# Patient Record
Sex: FEMALE | Race: WHITE | NOT HISPANIC OR LATINO | URBAN - METROPOLITAN AREA
[De-identification: names, ages, dates, MRNs, and addresses within clinical notes are randomized per-mention and may not be internally consistent; named-entity substitution may affect disease eponyms.]

---
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
## 2021-02-24 ENCOUNTER — COMMUNICATION - HEALTHEAST (OUTPATIENT)
Dept: PEDIATRICS | Facility: CLINIC | Age: 21
End: 2021-02-24

## 2021-02-24 ENCOUNTER — OFFICE VISIT - HEALTHEAST (OUTPATIENT)
Dept: PEDIATRICS | Facility: CLINIC | Age: 21
End: 2021-02-24

## 2021-02-24 DIAGNOSIS — N92.6 IRREGULAR MENSES: ICD-10-CM

## 2021-02-24 RX ORDER — NORGESTIMATE AND ETHINYL ESTRADIOL 7DAYSX3 LO
1 KIT ORAL DAILY
Qty: 1 PACKAGE | Refills: 0 | Status: SHIPPED | OUTPATIENT
Start: 2021-02-24

## 2021-02-24 ASSESSMENT — PATIENT HEALTH QUESTIONNAIRE - PHQ9: SUM OF ALL RESPONSES TO PHQ QUESTIONS 1-9: 4

## 2021-05-27 ASSESSMENT — PATIENT HEALTH QUESTIONNAIRE - PHQ9: SUM OF ALL RESPONSES TO PHQ QUESTIONS 1-9: 4

## 2021-05-29 NOTE — TELEPHONE ENCOUNTER
Patient is on the CSS schedule for Meningococcal B shot- 1st dose- per triage.    Please place orders.     Hoda Prieto CMA  12:44 PM  6/4/2019

## 2021-05-30 NOTE — TELEPHONE ENCOUNTER
Patient is here now for Men B injection, will pend order last dose.     Meningococcal B (PF) 6/11/2019

## 2021-05-31 VITALS — HEIGHT: 66 IN | WEIGHT: 128.7 LBS | BODY MASS INDEX: 20.68 KG/M2

## 2021-05-31 NOTE — PROGRESS NOTES
FOOT AND ANKLE SURGERY/PODIATRY CONSULT NOTE         ASSESSMENT:   Ingrown toenail right great toe      TREATMENT:  Performed partial nail excision and matrixectomy of the lateral border of the right great toenail today.        HPI: Mitsy Mcfarland presented to the clinic today complaining of a painful ingrown toenail involving her right great toe.  The patient stated that she has had a history of painful ingrown toenails involving this toe.  She stated that recently she had severe pain, redness and swelling.  She is currently taking the Keflex.  She stated that the redness and swelling have improved but her pain persist.  The pain is aggravated by her shoe gear.  It is a sharp pain.  There are no factors which relieve her pain.  She stated that she slightly injured her toe recently.      Past Medical History:   Diagnosis Date     VUR (vesicoureteric reflux)        History reviewed. No pertinent surgical history.    No Known Allergies      Current Outpatient Medications:      cephalexin (KEFLEX) 500 MG capsule, Take 500 mg by mouth 2 (two) times a day., Disp: , Rfl:      TRI-SPRINTEC, 28, 0.18/0.215/0.25 mg-35 mcg (28) Tab tablet, TAKE 1 TABLET DAILY, Disp: 84 tablet, Rfl: 0    Family History   Problem Relation Age of Onset     Diabetes type I Brother      Hypertension Mother      Breast cancer Paternal Aunt      Diabetes Maternal Grandmother      Lung cancer Maternal Grandfather      Heart attack Paternal Grandfather        Social History     Socioeconomic History     Marital status: Single     Spouse name: Not on file     Number of children: Not on file     Years of education: Not on file     Highest education level: Not on file   Occupational History     Not on file   Social Needs     Financial resource strain: Not on file     Food insecurity:     Worry: Not on file     Inability: Not on file     Transportation needs:     Medical: Not on file     Non-medical: Not on file   Tobacco Use     Smoking status: Never  Smoker     Smokeless tobacco: Never Used   Substance and Sexual Activity     Alcohol use: Not on file     Drug use: Not on file     Sexual activity: Not on file   Lifestyle     Physical activity:     Days per week: Not on file     Minutes per session: Not on file     Stress: Not on file   Relationships     Social connections:     Talks on phone: Not on file     Gets together: Not on file     Attends Anabaptism service: Not on file     Active member of club or organization: Not on file     Attends meetings of clubs or organizations: Not on file     Relationship status: Not on file     Intimate partner violence:     Fear of current or ex partner: Not on file     Emotionally abused: Not on file     Physically abused: Not on file     Forced sexual activity: Not on file   Other Topics Concern     Not on file   Social History Narrative    Lives with mother, father and brother Ellis       Review of Systems - Patient denies fever, chills, rash, wound, stiffness, limping, numbness, weakness, heart burn, blood in stool, chest pain with activity, calf pain when walking, shortness of breath with activity, chronic cough, easy bleeding/bruising, swelling of ankles, excessive thirst, fatigue, depression, anxiety.  Patient admits to painful ingrown toenail right great toe.      OBJECTIVE:  Appearance: alert, well appearing, and in no distress.    Vitals:    08/27/19 0957   BP: 100/70   Pulse: 101   SpO2: 98%       BMI= Body mass index is 20.81 kg/m .    General appearance: Patient is alert and fully cooperative with history & exam.  No sign of distress is noted during the visit.  Psychiatric: Affect is pleasant & appropriate.  Patient appears motivated to improve health.  Respiratory: Breathing is regular & unlabored while sitting.  HEENT: Hearing is intact to spoken word.  Speech is clear.  No gross evidence of visual impairment that would impact ambulation.    Vascular: Dorsalis pedis and posterior tibial pulses are palpable. There  is pedal hair growth bilaterally.  CFT < 3 sec from anterior tibial surface to distal digits bilaterally. There is no appreciable edema noted.  Dermatologic: All nails are normal length and color.  The lateral border of the right great toenail is severely incurvated.  There is pain on palpation along the lateral margin of the right great toenail.  Turgor and texture are within normal limits. No coloration or temperature changes. No primary or secondary lesions noted.  Neurologic: All epicritic and proprioceptive sensations are grossly intact bilaterally.  Musculoskeletal: All active and passive ankle, subtalar, midtarsal, and 1st MPJ range of motion are grossly intact without pain or crepitus, with the exception of none. Manual muscle strength is within normal limits bilaterally. All dorsiflexors, plantarflexors, invertors, evertors are intact bilaterally. Tenderness present to lateral margin of the right great toenail on palpation.  No tenderness to right great toe with range of motion. Calf is soft/non-tender without warmth/induration    Imaging:     No results found.       TREATMENT:  Performed partial nail excision and matrixectomy of the lateral border of the right great toenail today under local anesthesia of 2% lidocaine plain via the phenol and alcohol technique.  The patient tolerated the procedure and anesthesia well and was discharged in good condition.  She was given both written and verbal postoperative instructions.  She is to return to the clinic as needed.    Fantasma Jimenez; LUPE  [facility] Foot & Ankle Surgery/Podiatry

## 2021-05-31 NOTE — PATIENT INSTRUCTIONS - HE
POSTOPERATIVE INSTRUCTIONS AFTER CHEMICAL NAIL REMOVAL SURGERY    What to expect after surgery:  Your toe will be numb for around 2-8 hours after your nail procedure due to the shots that were given.  Expect some degree of soreness in your toe later today when the numbness wears off.  Rest, elevation and ice applied at the ankle will help ease the pain. Your bandage was wrapped snug to cut down on bleeding.    This may feel tight when the numbness wears off.  Please remove the bandage tomorrow morning and begin the foot soaks described below.  Warm water will feel good and helps to ease the pain  How to Care for Your Toe After Surgery  One daily foot soak will be necessary to heal properly.  Chemicals were used to kill the root of the nail.  Expect local redness, drainage and tenderness , this will last for 6-8 weeks.  Soaking helps loosen the scab and dried drainage.  Failure to soak leads to a hard scab that blocks drainage.  Back up drainage increases the pain and the scab may need to be removed with another office procedure.  You will heal much quicker and be more comfortable if you are consistent with local wound care and foot soaks.  Soak one time every day for 2 weeks.  Soaks should last 10 minutes.  Soak after taking a shower to get the germs out.  Soak in warm water with hydrogen peroxide 5 parts water to 1 part hydrogen peroxide.  A small amount of bleeding may be noted which is normal.   Clean the surgical site with a Q-tip during each soak.  Dip the Q-tip in the water and gently clean away any crusted drainage.  The area may be tender but you will not harm anything with the Q-tip.  Pat the area dry and allow a few minutes to air dry before applying any bandages.  Flexible fabric style band aids work best.  In general, the area will be wet from drainage.  A small dab of antibiotic ointment is okay but watch out for excessive moisture.  White and wrinkled skin is a sign of too much moisture and that the  skin needs to be dried out. It is ok to allow the toe some air by removing the band aid as needed.  Activity  Feel free to do normal activities as tolerated on the following day.  Wear open toe sandals if regular shoes are not comfortable.  Avoid shoes that are tight on the toe.  Medications   Finish any antibiotics if they have been prescribed.  Tylenol or ibuprofen may be used as needed for pain.  Icing and elevation also help with pain and swelling.  Risks  Watch for signs of infection.  It is normal to see redness, local tenderness, and drainage around the nail area for up to 8 weeks after permanent nail removal surgery.  Call the clinic at [phone] if you see red streaks spreading up the toe, foot, or leg.  Fever and chills are also concerning and you should notify the clinic if you are having these symptoms.  If these symptoms occur when the clinic is closed, please go to urgent care.  How Well Does Permanent Nail Surgery Work?  Permanent nail surgery means that we intend that the nail problem will not come back.  In a small percentage of patients, nail problems return in about 6 months to a year.  We would like to see you back in the office if you are experiencing problems in the future.  Please call [phone] with any additional questions.

## 2021-05-31 NOTE — TELEPHONE ENCOUNTER
Refill Approved    Rx renewed per Medication Renewal Policy. Medication was last renewed on 9/17/18    Last office visit 11/20/18    Lennie Galicia, [facility] Connection Triage/Med Refill 8/9/2019     Requested Prescriptions   Pending Prescriptions Disp Refills     TRI-SPRINTEC, 28, 0.18/0.215/0.25 mg-35 mcg (28) Tab tablet [Pharmacy Med Name: TRI-SPRINTEC TAB] 84 tablet 3     Sig: TAKE 1 TABLET DAILY       Oral Contraceptives Protocol Passed - 8/8/2019  2:14 AM        Passed - Visit with PCP or prescribing provider visit in last 12 months      Last office visit with prescriber/PCP: 10/28/2016 Penelope Alfonso MD OR same dept: Visit date not found OR same specialty: 10/28/2016 Penelope Alfonso MD  Last physical: 10/26/2018 Last MTM visit: Visit date not found   Next visit within 3 mo: Visit date not found  Next physical within 3 mo: Visit date not found  Prescriber OR PCP: Penelope Alfonso MD  Last diagnosis associated with med order: 1. Acne  - TRI-SPRINTEC, 28, 0.18/0.215/0.25 mg-35 mcg (28) Tab tablet [Pharmacy Med Name: TRI-SPRINTEC TAB]; TAKE 1 TABLET DAILY  Dispense: 84 tablet; Refill: 3    2. Irregular menses  - TRI-SPRINTEC, 28, 0.18/0.215/0.25 mg-35 mcg (28) Tab tablet [Pharmacy Med Name: TRI-SPRINTEC TAB]; TAKE 1 TABLET DAILY  Dispense: 84 tablet; Refill: 3    If protocol passes may refill for 12 months if within 3 months of last provider visit (or a total of 15 months).

## 2021-06-02 VITALS — WEIGHT: 127.4 LBS | BODY MASS INDEX: 20.88 KG/M2

## 2021-06-02 VITALS — BODY MASS INDEX: 20.27 KG/M2 | WEIGHT: 126.1 LBS | HEIGHT: 66 IN

## 2021-06-02 NOTE — TELEPHONE ENCOUNTER
I reviewed chart - has appt with Naty Dec 30.  Is away at [facility] now.  I sent refill for this medication to requested pharmacy x 90 day supply - should get her through until her appointment with Naty - please let patient know that this was sent.

## 2021-06-02 NOTE — TELEPHONE ENCOUNTER
RN cannot approve Refill Request    RN can NOT refill this medication PCP messaged that patient is overdue for Office Visit. Last office visit: 10/28/2016 Penelope Alfonso MD Last Physical: 10/26/2018 Last MTM visit: Visit date not found Last visit same specialty: 10/28/2016 Penelope Alfonso MD.  Next visit within 3 mo: Visit date not found  Next physical within 3 mo: Visit date not found      Rafia Augustin, Care Connection Triage/Med Refill 10/31/2019    Requested Prescriptions   Pending Prescriptions Disp Refills     TRI-SPRINTEC, 28, 0.18/0.215/0.25 mg-35 mcg (28) Tab tablet [Pharmacy Med Name: TRI-SPRINTEC TAB] 84 tablet 0     Sig: TAKE 1 TABLET DAILY       Oral Contraceptives Protocol Failed - 10/31/2019  2:13 AM        Failed - Visit with PCP or prescribing provider visit in last 12 months      Last office visit with prescriber/PCP: 10/28/2016 Penelope Alfonso MD OR same dept: Visit date not found OR same specialty: 10/28/2016 Penelope Alfonso MD  Last physical: 10/26/2018 Last MTM visit: Visit date not found   Next visit within 3 mo: Visit date not found  Next physical within 3 mo: Visit date not found  Prescriber OR PCP: Penelope Alfonso MD  Last diagnosis associated with med order: 1. Acne  - TRI-SPRINTEC, 28, 0.18/0.215/0.25 mg-35 mcg (28) Tab tablet [Pharmacy Med Name: TRI-SPRINTEC TAB]; TAKE 1 TABLET DAILY  Dispense: 84 tablet; Refill: 0    2. Irregular menses  - TRI-SPRINTEC, 28, 0.18/0.215/0.25 mg-35 mcg (28) Tab tablet [Pharmacy Med Name: TRI-SPRINTEC TAB]; TAKE 1 TABLET DAILY  Dispense: 84 tablet; Refill: 0    If protocol passes may refill for 12 months if within 3 months of last provider visit (or a total of 15 months).

## 2021-06-03 VITALS — BODY MASS INDEX: 20.41 KG/M2 | WEIGHT: 127 LBS | HEIGHT: 66 IN

## 2021-06-04 VITALS
WEIGHT: 132 LBS | HEIGHT: 66 IN | SYSTOLIC BLOOD PRESSURE: 104 MMHG | DIASTOLIC BLOOD PRESSURE: 72 MMHG | BODY MASS INDEX: 21.21 KG/M2 | HEART RATE: 84 BPM

## 2021-06-04 NOTE — PROGRESS NOTES
[facility] Well Child Check    ASSESSMENT & PLAN  Misty Mcfarland is a 19 y.o. who has normal growth and normal development.    Diagnoses and all orders for this visit:    Encounter for routine child health examination without abnormal findings  -     Chlamydia trachomatis & Neisseria gonorrhoeae, Amplified Detection  -     Pediatric Symptom Checklist (81303)    Acne    Irregular menses  -     Discontinue: norgestimate-ethinyl estradiol (ORTHO TRI-CYCLEN LO) 0.18/0.215/0.25 mg-25 mcg tablet; Take 1 tablet by mouth daily.  Dispense: 1 Package; Refill: 0  -     norgestimate-ethinyl estradiol (ORTHO TRI-CYCLEN LO) 0.18/0.215/0.25 mg-25 mcg tablet; Take 1 tablet by mouth daily.  Dispense: 3 Package; Refill: 3      We discussed the contraindication for estrogen containing hormonal birth control for individuals with migraine with aura.  Misty does not like her progestin only OCP due to increased acne and irregular bleeding.  I have discussed other options such as IUD or nexplanon placement.   If her migraines return with change to ortho tri cyclen LO, will recommend IUD vs. Nexplanon as birth control options.     Return to clinic in 1 year for a Well Child Check or sooner as needed    IMMUNIZATIONS/LABS  No immunizations due today.    REFERRALS  Dental:  The patient has already established care with a dentist.  Other:  No referrals were made at this time.    ANTICIPATORY GUIDANCE  I have reviewed age appropriate anticipatory guidance.  Social:  Friends, Peer Pressure and Need for Privacy  Parenting:  Support and [place]/Dependence  Nutrition:  Junk Food  Play and Communication:  Hobbies and Creative Talents  Health:  Acne and Sleep  Safety:  Contact Sports    HEALTH HISTORY  Do you have any concerns that you'd like to discuss today?: No concerns   Misty is a 18 y/o female presenting in clinic for a physical. Patient was last seen on 08/27/2019 for an ingrown toenail.     Migraines: This first semester of  college, she did not get migraines. Before this, she would get atleast 2 migraines a month. She has no idea why this changed because she was under more stress. The migraines come on with excitement. In college, she feels she had more excitement. Her sleep scheduled worsened with college, but she is consuming more water.     Birth Control: She changed her birth control to the progesterone only, in September of 2019. She changed because a doctor at the [facility] said with her migraines with aura, she should not be on birth control with estrogen.  Before this she had progesterone with estrogen. She dislikes the new birth control because she is getting her period every other week and her acne came back. She first went on birth control to improve her acne. Dr. Alfonso and Misty discussed other options of birth control instead of the pill. She is against the IUD, but would consider an implant if a change in the pill does not work.     ROS  All other systems are negative.     Roomed by: rozeli    Refills needed? No    Do you have any forms that need to be filled out? No        Do you have any significant health concerns in your family history?: No  Family History   Problem Relation Age of Onset     Diabetes type I Brother      Hypertension Mother      Breast cancer Paternal Aunt      Diabetes Maternal Grandmother      Lung cancer Maternal Grandfather      Heart attack Paternal Grandfather      Since your last visit, have there been any major changes in your family, such as a move, job change, separation, divorce, or death in the family?: Yes: moved house- started college and now in dorms.   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 She enjoyed her first semester of college. She lives in the dorm with a roommate. This went well, but she is not very close with her roommate. This semester she tried to not go home much.     Eating  Do you eat regular meals including fruits and vegetables?:  yes  What are you drinking (cow's milk, water, soda, juice, sports drinks, energy drinks, etc)?: water, soda and coffee  Have you been worried that you don't have enough food?: No  Do you have concerns about your body or appearance?:  No    Activities  Do you have friends?:  yes  Do you get at least one hour of physical activity per day?:  yes  How many hours a day are you in front of a screen other than for schoolwork (computer, TV, phone)?:  3  What do you do for exercise?:  Walk, rock center, tennis  Do you have interest/participate in community activities/volunteers/school sports?:  Yes  She works out at the gym. Some classes are a 25 minute walk for her.     VISION/HEARING  Vision: Completed. See Results  Hearing:  Completed. See Results     Hearing Screening    125Hz 250Hz 500Hz 1000Hz 2000Hz 3000Hz 4000Hz 6000Hz 8000Hz   Right ear:   20 20 20 20 20 20    Left ear:   20 20 20 20 20 20       Visual Acuity Screening    Right eye Left eye Both eyes   Without correction: 10/8 10/8 10/8   With correction:          MENTAL HEALTH SCREENING  Social-emotional & mental health screening: PSC-17 PASS (<15 pass), no followup necessary  No concerns    TB Risk Assessment:  The patient and/or parent/guardian answer positive to:  no known risk of TB    Dyslipidemia Risk Screening  Have either of your parents or any of your grandparents had a stroke or heart attack before age 55?: No  Any parents with high  "cholesterol or currently taking medications to treat?: No     Dental  When was the last time you saw the dentist?: 6-12 months ago   Last fluoride varnish application was within the past 30 days. Fluoride not applied today.      Patient Active Problem List   Diagnosis     Common Migraine (Without Aura)       Drugs  Does the patient use tobacco/alcohol/drugs?:  yes, she consumes alcohol once a month.     Safety  Does the patient have any safety concerns (peer or home)?:  no  Does the patient use safety belts, helmets and other safety equipment?:  yes    Sex  Have you ever had sex?:  Yes    MEASUREMENTS  Height:  5' 6\" (1.676 m)  Weight: 132 lb (59.9 kg)  BMI: Body mass index is 21.31 kg/m .  Blood Pressure: 104/72  Blood pressure percentiles are not available for patients who are 18 years or older.    PHYSICAL EXAM  Constitutional: She appears well-developed and well-nourished.   HEENT: Head: Normocephalic.    Right Ear: Tympanic membrane, external ear and canal normal.    Left Ear: Tympanic membrane, external ear and canal normal.    Nose: Nose normal.    Mouth/Throat: Mucous membranes are moist. Oropharynx is clear.    Eyes: Conjunctivae and lids are normal. Pupils are equal, round, and reactive to light. Optic discs are sharp.   Neck: Neck supple. No tenderness is present.   Cardiovascular: Normal rate and regular rhythm. No murmur heard.  Pulses: Femoral pulses are 2+ bilaterally.   Pulmonary/Chest: Effort normal and breath sounds normal. There is normal air entry. Breast development is normal.  Hawk stage 5.   Abdominal: Soft. There is no hepatosplenomegaly. No inguinal hernia.   Musculoskeletal: Normal range of motion. Normal strength and tone. No abnormalities. Spine is straight. Normal duck walk.  Normal heel to toe walk.   : Normal external female genitalia.  Hawk stage 5.   Neurological: She is alert. She has normal reflexes. Gait normal.   Psychiatric: She has a normal mood and affect. Her speech is " normal and behavior is normal.  Skin: Clear. No rashes.     Physical on 12/30/2019   Component Date Value Ref Range Status     Chlamydia trachomatis, Amplified D* 12/30/2019 Negative  Negative Final     Neisseria gonorrhoeae, Amplified D* 12/30/2019 Negative  Negative Final           ADDITIONAL HISTORY SUMMARIZED (2): Information obtained from [employer] on 09/07/2019.  DECISION TO OBTAIN EXTRA INFORMATION (1): None.   RADIOLOGY TESTS (1): None.  LABS (1): Labs ordered.  MEDICINE TESTS (1): None.  INDEPENDENT REVIEW (2 each): None.     The visit lasted a total of 25 minutes face to face with the patient. Over 50% of the time was spent counseling and educating the patient about wellness.    I, Tita Sharpe, am scribing for and in the presence of, Dr. Alfonso.    I, Dr. Penelope Alfonso , personally performed the services described in this documentation, as scribed by Tita Sharpe in my presence, and it is both accurate and complete.    Total data points: 3

## 2021-06-06 NOTE — TELEPHONE ENCOUNTER
RN cannot approve Refill Request    RN can NOT refill this medication med is not covered by policy/route to provider. Last office visit: 10/28/2016 Penelope Alfonso MD Last Physical: 12/30/2019 Last MTM visit: Visit date not found Last visit same specialty: 10/28/2016 Penelope Alfonso MD.  Next visit within 3 mo: Visit date not found  Next physical within 3 mo: Visit date not found      Renetta Garcia, Care Connection Triage/Med Refill 2/18/2020    Requested Prescriptions   Pending Prescriptions Disp Refills     MARVA 0.35 mg tablet [Pharmacy Med Name: MARVA 0.35 MG TABLET] 84 tablet 1     Sig: TAKE 1 TABLET BY MOUTH EVERY DAY       There is no refill protocol information for this order

## 2021-06-07 NOTE — TELEPHONE ENCOUNTER
Refill request for medication: norgestimate-ethinyl estradiol (ORTHO TRI-CYCLEN LO) 0.18/0.215/0.25 mg-25 mcg tablet  Last visit addressing this medication: 12/30/2019  Follow up plan Return to clinic in 1 year for a Well Child Check or sooner as needed    Last refill on 12/30/2019, quantity 3 Packages     Appointment: Not due     Debbie Thomas, [facility]

## 2021-06-11 NOTE — PROGRESS NOTES
[facility] Well Child Check    ASSESSMENT & PLAN  Misty Mcfarland is a 17  y.o. 3  m.o. who has normal growth and normal development.    Diagnoses and all orders for this visit:    Encounter for routine child health examination without abnormal findings  -     Hearing Screening  -     Vision Screening    Acne  -     norgestimate-ethinyl estradiol (ORTHO TRI-CYCLEN, 28,) 0.18/0.215/0.25 mg-35 mcg (28) Tab tablet; Take 1 tablet by mouth daily.  Dispense: 3 Package; Refill: 4    Irregular menses  -     norgestimate-ethinyl estradiol (ORTHO TRI-CYCLEN, 28,) 0.18/0.215/0.25 mg-35 mcg (28) Tab tablet; Take 1 tablet by mouth daily.  Dispense: 3 Package; Refill: 4    Other orders  -     Meningococcal MCV4P      Return to clinic in 1 year for a Well Child Check or sooner as needed   Misty's use of OCPs has helped with her acne and dysmenorrhea.  Will continue with 1 year of refills.        IMMUNIZATIONS/LABS  Immunizations were reviewed and orders were placed as appropriate. and I have discussed the risks and benefits of all of the vaccine components with the patient/parents.  All questions have been answered.    REFERRALS  Dental:  Recommend routine dental care as appropriate., The patient has already established care with a dentist.  Other:  No referrals were made at this time.    ANTICIPATORY GUIDANCE  I have reviewed age appropriate anticipatory guidance.  Social:  Friends, Extracurricular Activities and Changes and Choices  Parenting:  [place]/Dependence, Homework and Confidential Health Care  Nutrition:  Age Specific Nutritional Needs  Play and Communication:  Organized Sports, Hobbies and Read Books  Health:  Activity (>45 min/day), Sleep, Sun Screen and Dental Care  Safety:  Seat Belts, Swimming Safety, Bike/Motorcycle Helmets and Outdoor Safety Avoiding Sun Exposure  Sexuality:  STD's and Contraception    HEALTH HISTORY  Do you have any concerns that you'd like to discuss today?: No concerns     No question  data found.    Do you have any significant health concerns in your family history?: No   Family History   Problem Relation Age of Onset     Diabetes type I Brother       Since your last visit, have there been any major changes in your family, such as a move, job change, separation, divorce, or death in the family?: No    Home  Who lives in your home?:  See narrative below.  Social History     Social History Narrative    Lives with mother, father and brother Ellis     Do you have any trouble with sleep?:  No, she falls asleep easily in the evening and sleeps soundly through the night without waking. She gets sufficient restful sleep each night. She is well-rested and has a good daytime energy level.    Education  What school does your child attend?:  [employer]   What grade is your child in?:  11th  How does the patient perform in school (grades, behavior, attention, homework?: Pretty good. She had a good sophomore year of high school. She earns good grades. She takes advanced courses and is involved in multiple extracurricular activities. She has good friendships. She is doing well academically and socially. She plans to attend to college after high school. She is interested in biomedical engineering and law. She would like to attend a large university but has not yet started visiting colleges.    Eating She has a good appetite. She eats three meals per day. She eats a healthy, balanced diet with a variety of fruits, vegetables, and proteins. She drinks Fairlife skim milk and water daily. She occasionally drinks soda and juice. She is well-nourished and maintaining a healthy body weight. She does not take a daily multivitamin.  Does patient eat regular meals including fruits and vegetables?:  yes  What is the patient drinking (cow's milk, water, soda, juice, sports drinks, energy drinks, etc)?: cow's milk- skim, water, soda, juice, sports drinks and energy drinks  Does patient have concerns about body or appearance?:   "No    Activities  Does the patient have friends?:  yes  Does the patient get at least one hour of physical activity per day?:  yes  Does the patient have less than 2 hours of screen time per day (aside from homework)?:  About 2 hours   What does your child do for exercise?:  Volleyball, speed, quickness, and agility, weightlifting   Does the patient have interest/participate in community activities/volunteers/school sports?:  yes, [employer], [employer], [employer], and work at [employer].     MENTAL HEALTH SCREENING  PHQ-2 Total Score: 0 (7/25/2016  1:00 PM)    VISION/HEARING  Vision: Completed. See Results  Hearing:  Completed. See Results    No exam data present    TB Risk Assessment:  The patient and/or parent/guardian answer positive to:  patient and/or parent/guardian answer 'no' to all screening TB questions    Dental  Is your child being seen by a dentist?  Yes     Patient Active Problem List   Diagnosis     Common Migraine (Without Aura)     Drugs  Does the patient use tobacco/alcohol/drugs?:  No. She has chosen she wants to wait til older for use of EtOH or marijuana.  She is at parties with usage present.    Safety  Does the patient have any safety concerns (peer or home)?:  no  Does the patient use safety belts, helmets and other safety equipment?:  yes    Sex  Is the patient sexually active?:  yes, she describes herself as straight.  She states it has been \"a long time\" since she has had sex.  Does use condoms. 1 previous partner.    REVIEW OF SYSTEMS  History obtained from mother and child.  General: Negative  Menstruation: She has regular monthly periods. Her menorrhea is normal and lasts for 4-5 days. She changes her pad 3 times per day. She does not have intense cramping.  Migraines: She had a migraine a few weeks ago. She woke up that morning and ate breakfast feeling fine. She then soon developed head pain. She usually takes Ibuprofen with water and sleeps to relieve her " "head pain. She experiences visual auras and photosensitivity. Her migraines typically last 3-4 hours. She often wakes up with a lingering headache.  Mood: She has a generally happy mood.  Dental: She brushes her teeth daily. She does not have dental caries.  Her parents have no other health or developmental concerns.    MEASUREMENTS  Height:  5' 5.5\" (1.664 m)  Weight: 128 lb 11.2 oz (58.4 kg)  BMI: Body mass index is 21.09 kg/(m^2).  Blood Pressure: 110/68  Blood pressure percentiles are 40 % systolic and 54 % diastolic based on NHBPEP's 4th Report. Blood pressure percentile targets: 90: 126/81, 95: 130/85, 99 + 5 mmH/97.    PHYSICAL EXAM  General: Awake, Alert and Cooperative   Head: Normocephalic and Atraumatic   Eyes: PERRL and EOMI   ENT: Normal pearly TMs bilaterally and Oropharynx clear. Tonsils normal. Normal dentition.   Neck: Supple and Thyroid without enlargement or nodules. No lymphadenopathy.   Chest: Chest wall normal and Breasts sexual maturity rating 5   Lungs: Clear to auscultation bilaterally   Heart:: Regular rate and rhythm and no murmurs. Femoral pulses 2+ bilaterally.   Abdomen: Soft, nontender, nondistended, no mass palpable, and no hepatosplenomegaly   : normal external female genitalia and sexual maturity rating 5.   Spine: Spine straight without curvature noted   Musculoskeletal: Moving all extremities and No pain in the extremities   Neuro: Alert and oriented times 3, Normal tone in upper and lower extremities, Grossly normal and DTRs +2 bilaterally   Skin: No lesions or rashes noted     Complete sports physical and questionnaire completed, no restrictions.    ADDITIONAL HISTORY SUMMARIZED (2): None.  DECISION TO OBTAIN EXTRA INFORMATION (1): None.   RADIOLOGY TESTS (1): None.  LABS (1): None.  MEDICINE TESTS (1): None.  INDEPENDENT REVIEW (2 each): None.     The visit lasted a total of 28 minutes face to face with the patient. Over 50% of the time was spent counseling and " educating the patient about her overall health and development.    I, Nabeel Campos, am scribing for and in the presence of, Dr. Alfonso.    I, Penelope Alfonso, personally performed the services described in this documentation, as scribed by Nabeel Campos in my presence, and it is both accurate and complete.    Total Data Points: 0

## 2021-06-15 NOTE — PROGRESS NOTES
Misty Mcfarland is a 20 y.o. female who is being evaluated via a billable video visit.      How would you like to obtain your AVS? MyChart.  If dropped from the video visit, the video invitation should be resent by: Send to e-mail at: [email or web address]  Will anyone else be joining your video visit? No      Video Start Time: 4:22 pm    Assessment & Plan     Irregular menses    - norgestimate-ethinyl estradioL (ORTHO TRI-CYCLEN LO) 0.18/0.215/0.25 mg-25 mcg tablet; Take 1 tablet by mouth daily.    We discussed alternative options for birth control and period regulation. Last year we had the discussion regarding combination estrogen / progesterone OCP was not recommended for patients with migraines with auras.  At the start of college and freshmen year- Misty went a year without migraines. She has had 2 in the past year.  She did not respond well to her progestin only OCP and after discussion of risks for higher estrogen states in women with migraines with aura, she concluded she wanted to restart combo OCP with low dose estrogen   Today, we again discussed alternative birthcontrol.  She states she has insurance with little coverage/ poor benefits at the current time.  I have advised the [facility] in [place] or [facility] for evaluation for IUD.  1 month of OCP given to Misty to allow time for evaluation and change to alternative method.              No follow-ups on file.    Penelope Alfonso MD  [facility]   Misty Mcfarland is 20 y.o. and presents today for the following health issues : OCP  HPI   Misty is currently on ortho tri cyclen LO for birth control and period regulation. She has tried progestin only OCP and did not like it- caused her to have her period every other week.  After discussion last year on the risks with combo hormone for her given migraine with aura- she requested ortho tri cyclen LO and I had advised pursuing  alternative method such as IUD.    She now is interested in IUD but has poor insurance benefits for the next several months.     She is getting her period monthly.  She has had 2 migraines in the past year. She does have aura symptoms. She has male sex partners and has had negative STI testing in the past year.       Objective    Vitals - Patient Reported  Weight (Patient Reported): 147 lb (66.7 kg)    Physical Exam  GENERAL: Healthy, alert and no distress  EYES: Eyes grossly normal to inspection. No discharge or erythema, or obvious scleral/conjunctival abnormalities.  RESP: No audible wheeze, cough, or visible cyanosis.  No visible retractions or increased work of breathing.    NEURO: Cranial nerves grossly intact. Mentation and speech appropriate for age.  PSYCH: Mentation appears normal, affect normal/bright, judgement and insight intact, normal speech and appearance well-groomed            Video-Visit Details    Type of service:  Video Visit    Video End Time (time video stopped): 4:40 pm  Originating Location (pt. Location): Home    Distant Location (provider location):  [facility]     Platform used for Video Visit: [employer]

## 2021-06-17 NOTE — PATIENT INSTRUCTIONS - HE
Patient Instructions by Penelope Alfonso MD at 12/30/2019  9:00 AM     Author: Penelope Alfonso MD Service: -- Author Type: Physician    Filed: 12/30/2019  9:48 AM Encounter Date: 12/30/2019 Status: Addendum    : Penelope Alfonso MD (Physician)    Related Notes: Original Note by Penelope Alfonso MD (Physician) filed at 12/30/2019  9:47 AM       Notify me if you are getting migraines         Patient Education      [employer] HANDOUT- PATIENT  18 THROUGH 21 YEAR VISITS  Here are some suggestions from [employer] experts that may be of value to your family.     HOW YOU ARE DOING  Enjoy spending time with your family.  Find activities you are really interested in, such as sports, theater, or volunteering.  Try to be responsible for your schoolwork or work obligations.  Always talk through problems and never use violence.  If you get angry with someone, try to walk away.  If you feel unsafe in your home or have been hurt by someone, let us know. Hotlines and community agencies can also provide confidential help.  Talk with us if you are worried about your living or food situation. Community agencies and programs such as SNAP can help.  Dont smoke, vape, or use drugs. Avoid people who do when you can. Talk with us if you are worried about alcohol or drug use in your family.    YOUR DAILY LIFE  Visit the dentist at least twice a year.  Brush your teeth at least twice a day and floss once a day.  Be a healthy eater.  Have vegetables, fruits, lean protein, and whole grains at meals and snacks.  Limit fatty, sugary, salty foods that are low in nutrients, such as candy, chips, and ice cream.  Eat when youre hungry. Stop when you feel satisfied.  Eat breakfast.  Drink plenty of water.  Make sure to get enough calcium every day.  Have 3 or more servings of low-fat (1%) or fat-free milk and other low-fat dairy products, such as yogurt and cheese.  Women: Make sure to eat foods rich in  folate, such as fortified grains and dark- green leafy vegetables.  Aim for at least 1 hour of physical activity every day.  Wear safety equipment when you play sports.  Get enough sleep.  Talk with us about managing your health care and insurance as an adult.    YOUR FEELINGS  Most people have ups and downs. If you are feeling sad, depressed, nervous, irritable, hopeless, or angry, let us know or reach out to another health care professional.  Figure out healthy ways to deal with stress.  Try your best to solve problems and make decisions on your own.  Sexuality is an important part of your life. If you have any questions or concerns, we are here for you.    HEALTHY BEHAVIOR CHOICES  Avoid using drugs, alcohol, tobacco, steroids, and diet pills. Support friends who choose not to use.  If you use drugs or alcohol, let us know or talk with another trusted adult about it. We can help you with quitting or cutting down on your use.  Make healthy decisions about your sexual behavior.  If you are sexually active, always practice safe sex. Always use birth control along with a condom to prevent pregnancy and sexually transmitted infections.  All sexual activity should be something you want. No one should ever force or try to convince you.  Protect your hearing at work, home, and concerts. Keep your earbud volume down.    STAYING SAFE  Always be a safe and cautious .  Insist that everyone use a lap and shoulder seat belt.  Limit the number of friends in the car and avoid driving at night.  Avoid distractions. Never text or talk on the phone while you drive.  Do not ride in a vehicle with someone who has been using drugs or alcohol.  If you feel unsafe driving or riding with someone, call someone you trust to drive you.  Wear helmets and protective gear while playing sports. Wear a helmet when riding a bike, a motorcycle, or an ATV or when skiing or skateboarding.  Always use sunscreen and a hat when youre  outside.  Fighting and carrying weapons can be dangerous. Talk with your parents, teachers, or doctor about how to avoid these situations.      Helpful Resources:  National Domestic Violence Hotline: 229.823.2201   Consistent with Bright Futures: Guidelines for Health Supervision of Infants, Children, and Adolescents, 4th Edition  For more information, go to https://brightfutures.aap.org.

## 2021-06-19 NOTE — LETTER
Letter by Penelope Alfonso MD at      Author: Penelope Alfonso MD Service: -- Author Type: --    Filed:  Encounter Date: 9/9/2019 Status: (Other)         September 9, 2019     Patient: Misty Mcfarland   YOB: 2000   Date of Visit: 9/9/2019       To Whom it May Concern:    Misty Mcfarland has been a patient of mine in our pediatric clinic since she was a young child.  She has a diagnosis of migraine with aura.  The frequency is typically once per month.  Please be aware that this is a medical condition she does indeed have and I advocate for her to have appropriate accommodations in place should she have a migraine which requires her to miss class or exam, or other required activity.     If you have any questions or concerns, please don't hesitate to call.    Sincerely,     Penelope Alfonso MD 9/9/2019 5:32 PM       Electronically signed by Penelope Alfonso MD

## 2021-06-21 NOTE — PROGRESS NOTES
Assessment/Plan:     1. Post-viral cough syndrome  Lungs are clear and patient is afebrile.  I do suspect a postviral cough versus active infection.  Discussed that the symptoms will usually resolve on their own in the next 3-4 weeks.  Patient is requesting a chest x-ray today.  This was completed.  Personally reviewed as negative.  Radiology agrees.  I did offer patient an antitussive medication, patient declines.  Recommend follow-up if cough worsens, she develops a fever, or if symptoms do not fully improve.    2. Cough  - XR Chest 2 Views        Subjective:     Misty Mcfarland is a 18 y.o. female accompanied by her mother who presents with a cough times 1 month.  States symptoms started with a cold including sinus congestion and runny nose.  Those symptoms have since resolved.  She continues to have a nonproductive cough.  Symptoms are worse first thing in the morning and at night when she is sleeping.  Patient is not bothered by the cough, but her mom can hear her coughing all night long.  Patient reports she is sleeping well.  Denies any fever, sinus congestion, sore throat, or ear pain.  No wheezing, shortness of breath, or chest pain.  No history of asthma.  Patient does not smoke.  No treatments tried at home.  Patient is adequately immunized.  She is requesting a chest x-ray today.      The following portions of the patient's history were reviewed and updated as appropriate: allergies, current medications.    Review of Systems  A comprehensive review of systems was performed and was otherwise negative    Objective:     /74 (Patient Site: Right Arm, Patient Position: Sitting, Cuff Size: Adult Regular)   Pulse 73   Temp 97.5  F (36.4  C) (Oral)   Wt 127 lb 6.4 oz (57.8 kg)   LMP 11/14/2018 (Exact Date)   SpO2 96%     General Appearance: Alert, cooperative, no distress, appears stated age  Ears: Normal TM's and external ear canals, both ears  Nose: Nares normal, septum midline  Throat: Lips,  mucosa, and tongue normal; teeth and gums normal  Neck: Supple, symmetrical, trachea midline, no adenopathy  Lungs: Clear to auscultation bilaterally, respirations unlabored. No rhonchi or wheezing.  Heart: Regular rate and rhythm, S1 and S2 normal, no murmur, rub, or gallop    Isis Wagstrom, NP-C

## 2021-06-21 NOTE — PROGRESS NOTES
[facility] Well Child Check    ASSESSMENT & PLAN  Misty Mcfarland is a 18  y.o. 6  m.o. who has normal growth and normal development.    Diagnoses and all orders for this visit:    Encounter for routine child health examination without abnormal findings  -     Hearing Screening  -     Vision Screening  -     PHQ9 Depression Screen  -     Influenza, Seasonal,Quad Inj, 36+ MOS (multi-dose vial)  -     Chlamydia trachomatis & Neisseria gonorrhoeae, Amplified Detection    Irregular menses    Other orders  -     Meningococcal B (PF); Future    Continue with OCP for regulation of menses.  Per screening guidelines, will check chlamydia screening today. Also will check gonorrhea.  She will follow up at her 19 year old physical this summer prior to going to college.    Return to clinic in 1 year for a Well Child Check or sooner as needed    IMMUNIZATIONS/LABS  Immunizations were reviewed and orders were placed as appropriate. and I have discussed the risks and benefits of all of the vaccine components with the patient/parents.  All questions have been answered.    REFERRALS  Dental:  The patient has already established care with a dentist.  Other:  No referrals were made at this time.    ANTICIPATORY GUIDANCE  I have reviewed age appropriate anticipatory guidance.  Social:  Friends  Nutrition:  Well balanced diet  Play and Communication:  Sports  Health:  Birth Control Pills    HEALTH HISTORY  Do you have any concerns that you'd like to discuss today?: No concerns      Pt reports her volleyball season ended yesterday. Pt started her college applications. Pt would like to pursue a career in biomedical engineering. Pt has been taking her birth control pills regularly and has not missed a dose. Pt's mother helps to make sure the patient has a well balanced diet.    No question data found.    Do you have any significant health concerns in your family history?: No  Family History   Problem Relation Age of Onset     Diabetes  type I Brother      Hypertension Mother      Breast cancer Paternal Aunt      Diabetes Maternal Grandmother      Lung cancer Maternal Grandfather      Heart attack Paternal Grandfather      Since your last visit, have there been any major changes in your family, such as a move, job change, separation, divorce, or death in the family?: No  Has a lack of transportation kept you from medical appointments?: No    Home  Who lives in your home?:  Mom,dad,brother  Social History     Social History Narrative    Lives with mother, father and brother Ellis     Do you have any concerns about losing your housing?: No  Is your housing safe and comfortable?: Yes  Do you have any trouble with sleep?:  No    Education  What school do you child attend?:  [employer]  What grade are you in?:  12th  How do you perform in school (grades, behavior, attention, homework?: Good     Eating  Do you eat regular meals including fruits and vegetables?:  yes  What are you drinking (cow's milk, water, soda, juice, sports drinks, energy drinks, etc)?: cow's milk- skim, water and soda  Have you been worried that you don't have enough food?: No  Do you have concerns about your body or appearance?:  No    Activities  Do you have friends?:  yes  Do you get at least one hour of physical activity per day?:  yes  How many hours a day are you in front of a screen other than for schoolwork (computer, TV, phone)?:  2  What do you do for exercise?:  Volley ball, tennis, jog, lift weights  Do you have interest/participate in community activities/volunteers/school sports?:  yes    MENTAL HEALTH SCREENING  PHQ-2 Total Score: 0 (10/26/2018 11:00 AM)  PHQ-9 Total Score: 0 (10/26/2018 11:00 AM)    VISION/HEARING  Vision: Completed. See Results  Hearing:  Completed. See Results     Hearing Screening    125Hz 250Hz 500Hz 1000Hz 2000Hz 3000Hz 4000Hz 6000Hz 8000Hz   Right ear:   20 20 20  20 20    Left ear:   20 20 20  20 20       Visual Acuity Screening    Right eye Left  "eye Both eyes   Without correction: 10/10 10/10 10/10   With correction:      Comments: Plus lens passed      TB Risk Assessment:  The patient and/or parent/guardian answer positive to:  patient and/or parent/guardian answer 'no' to all screening TB questions    Dyslipidemia Risk Screening  Have either of your parents or any of your grandparents had a stroke or heart attack before age 55?: No  Any parents with high cholesterol or currently taking medications to treat?: No     Dental  When was the last time you saw the dentist?: 1-3 months ago   Last fluoride varnish application was within the past 30 days. Fluoride not applied today.      Patient Active Problem List   Diagnosis     Common Migraine (Without Aura)       Drugs  Does the patient use tobacco/alcohol/drugs?:  No; close peers do use marijuana and alcohol though.    Safety  Does the patient have any safety concerns (peer or home)?:  no  Does the patient use safety belts, helmets and other safety equipment?:  yes    Sex  Have you ever had sex?:  Yes; 2 lifetime partners.  Current partner (male) for the past several months.     MEASUREMENTS  Height:  5' 5.5\" (1.664 m)  Weight: 126 lb 1.6 oz (57.2 kg)  BMI: Body mass index is 20.66 kg/(m^2).  Blood Pressure: 110/60  Blood pressure percentiles are 44 % systolic and 22 % diastolic based on the 2017 AAP Clinical Practice Guideline. Blood pressure percentile targets: 90: 125/78, 95: 128/82, 95 + 12 mmH/94.    PHYSICAL EXAM  Constitutional: She appears well-developed and well-nourished. She is awake, alert, and cooperative.  HEENT: Head: Normocephalic. Atraumatic.   Right Ear: Normal, pearly tympanic membrane; external ear and canal normal.    Left Ear: Normal, pearly tympanic membrane; external ear and canal normal.    Nose: Nose normal.    Mouth/Throat: Mucous membranes are moist. Oropharynx is clear. . Normal dentition.   Eyes: Conjunctivae and lids are normal. PERRL, EOMI.   Neck: Supple without " lymphadenopathy or tenderness. No thyromegaly or nodules.  Cardiovascular: Normal rate and regular rhythm. No murmur heard. Femoral pulses 2+ bilaterally.  Pulmonary: Clear to auscultation bilaterally. Effort and breath sounds normal. There is normal air entry.   Chest: Normal chest wall. Breast development is normal. SMR 5.   Abdominal: Soft, nontender, nondistended. Bowel sounds are normal. No hepatosplenomegaly.  Musculoskeletal: Moving all extremities with normal range of motion. Normal strength and tone. No abnormalities. No pain in the extremities.  Genitourinary: Normal external female genitalia. SMR 5.   Neurological: She is alert and oriented x3. She has normal reflexes. Normal tone and DTRs +2 bilaterally.  Psychiatric: She has a normal mood and affect. Her speech and behavior are normal.  Skin: Clear. No rashes or lesions noted.    ADDITIONAL HISTORY SUMMARIZED (2): None.   DECISION TO OBTAIN EXTRA INFORMATION (1): None.   RADIOLOGY TESTS (1): None.  LABS (1): None.  MEDICINE TESTS (1): None.  INDEPENDENT REVIEW (2 each): None.     Total data points = 0    Total time was 30 minutes, greater than 50% counseling and coordinating care regarding the above issues.    By signing my name below, I, Ethel Amor, attest that this documentation has been prepared under the direction and in the presence of Dr. Penelope Alfonso.  Electronic Signature: Tatiana Minor. 10/26/2018 11:21AM.    I, Dr. Penelope Alfonso , personally performed the services described in this documentation. All medical record entries made by the scribe were at my direction and in my presence. I have reviewed the chart and discharge instructions (if applicable) and agree that the record reflects my personal performance and is accurate and complete.

## 2021-07-03 NOTE — ADDENDUM NOTE
Addendum Note by Narcisa Andre CMA at 8/27/2019  9:40 AM     Author: Narcisa Andre CMA Service: -- Author Type: Certified Medical Assistant    Filed: 8/27/2019  1:00 PM Encounter Date: 8/27/2019 Status: Signed    : Narcisa Andre CMA (Certified Medical Assistant)    Addended by: NARCISA ANDRE on: 8/27/2019 01:00 PM        Modules accepted: Orders

## 2021-07-03 NOTE — ADDENDUM NOTE
Addendum Note by Erin Valencia DPM at 8/27/2019  9:40 AM     Author: Erin Valencia DPM Service: -- Author Type: Physician    Filed: 8/27/2019  1:57 PM Encounter Date: 8/27/2019 Status: Signed    : Erin Valencia DPM (Physician)    Addended by: ERIN VALENCIA on: 8/27/2019 01:57 PM        Modules accepted: Orders

## 2021-08-01 ENCOUNTER — HEALTH MAINTENANCE LETTER (OUTPATIENT)
Age: 21
End: 2021-08-01

## 2021-09-26 ENCOUNTER — HEALTH MAINTENANCE LETTER (OUTPATIENT)
Age: 21
End: 2021-09-26

## 2022-08-28 ENCOUNTER — HEALTH MAINTENANCE LETTER (OUTPATIENT)
Age: 22
End: 2022-08-28

## 2023-01-14 ENCOUNTER — HEALTH MAINTENANCE LETTER (OUTPATIENT)
Age: 23
End: 2023-01-14

## 2024-04-23 ENCOUNTER — TRANSFERRED RECORDS (OUTPATIENT)
Dept: HEALTH INFORMATION MANAGEMENT | Facility: CLINIC | Age: 24
End: 2024-04-23